# Patient Record
Sex: MALE | Race: WHITE | NOT HISPANIC OR LATINO
[De-identification: names, ages, dates, MRNs, and addresses within clinical notes are randomized per-mention and may not be internally consistent; named-entity substitution may affect disease eponyms.]

---

## 2017-02-01 ENCOUNTER — APPOINTMENT (OUTPATIENT)
Dept: INTERNAL MEDICINE | Facility: CLINIC | Age: 37
End: 2017-02-01

## 2017-02-05 ENCOUNTER — RX RENEWAL (OUTPATIENT)
Age: 37
End: 2017-02-05

## 2017-08-25 ENCOUNTER — LABORATORY RESULT (OUTPATIENT)
Age: 37
End: 2017-08-25

## 2017-08-25 ENCOUNTER — APPOINTMENT (OUTPATIENT)
Dept: INTERNAL MEDICINE | Facility: CLINIC | Age: 37
End: 2017-08-25
Payer: COMMERCIAL

## 2017-08-25 VITALS
WEIGHT: 180 LBS | BODY MASS INDEX: 24.38 KG/M2 | TEMPERATURE: 98.1 F | HEIGHT: 72 IN | OXYGEN SATURATION: 98 % | SYSTOLIC BLOOD PRESSURE: 142 MMHG | HEART RATE: 74 BPM | DIASTOLIC BLOOD PRESSURE: 86 MMHG

## 2017-08-25 LAB
BILIRUB UR QL STRIP: NEGATIVE
CLARITY UR: CLEAR
COLLECTION METHOD: NORMAL
GLUCOSE UR-MCNC: NEGATIVE
HCG UR QL: 0.2 EU/DL
HGB UR QL STRIP.AUTO: NEGATIVE
KETONES UR-MCNC: NEGATIVE
LEUKOCYTE ESTERASE UR QL STRIP: NEGATIVE
NITRITE UR QL STRIP: NEGATIVE
PH UR STRIP: 6
PROT UR STRIP-MCNC: NEGATIVE
SP GR UR STRIP: 1.02

## 2017-08-25 PROCEDURE — 99395 PREV VISIT EST AGE 18-39: CPT | Mod: 25

## 2017-08-25 PROCEDURE — 81002 URINALYSIS NONAUTO W/O SCOPE: CPT

## 2017-08-25 PROCEDURE — 36415 COLL VENOUS BLD VENIPUNCTURE: CPT

## 2017-08-29 LAB
25(OH)D3 SERPL-MCNC: 56.8 NG/ML
BASOPHILS # BLD AUTO: 0.03 K/UL
BASOPHILS NFR BLD AUTO: 0.7 %
CHOLEST SERPL-MCNC: 185 MG/DL
CHOLEST/HDLC SERPL: 3.9 RATIO
EOSINOPHIL # BLD AUTO: 0.1 K/UL
EOSINOPHIL NFR BLD AUTO: 2.2 %
HBA1C MFR BLD HPLC: 5.5 %
HCT VFR BLD CALC: 44.6 %
HDLC SERPL-MCNC: 47 MG/DL
HGB BLD-MCNC: 14.2 G/DL
IMM GRANULOCYTES NFR BLD AUTO: 0.2 %
LDLC SERPL CALC-MCNC: 123 MG/DL
LYMPHOCYTES # BLD AUTO: 1.74 K/UL
LYMPHOCYTES NFR BLD AUTO: 38.6 %
MAN DIFF?: NORMAL
MCHC RBC-ENTMCNC: 28.8 PG
MCHC RBC-ENTMCNC: 31.8 GM/DL
MCV RBC AUTO: 90.5 FL
MONOCYTES # BLD AUTO: 0.47 K/UL
MONOCYTES NFR BLD AUTO: 10.4 %
NEUTROPHILS # BLD AUTO: 2.16 K/UL
NEUTROPHILS NFR BLD AUTO: 47.9 %
PLATELET # BLD AUTO: 334 K/UL
RBC # BLD: 4.93 M/UL
RBC # FLD: 14.2 %
T4 SERPL-MCNC: 10.1 UG/DL
TESTOST BND SERPL-MCNC: 6.3 PG/ML
TESTOST SERPL-MCNC: 734.2 NG/DL
TRIGL SERPL-MCNC: 76 MG/DL
TSH SERPL-ACNC: 2.25 UIU/ML
WBC # FLD AUTO: 4.51 K/UL

## 2018-03-08 ENCOUNTER — APPOINTMENT (OUTPATIENT)
Dept: INTERNAL MEDICINE | Facility: CLINIC | Age: 38
End: 2018-03-08
Payer: COMMERCIAL

## 2018-03-08 VITALS
BODY MASS INDEX: 25.06 KG/M2 | TEMPERATURE: 98.8 F | DIASTOLIC BLOOD PRESSURE: 91 MMHG | HEIGHT: 72 IN | OXYGEN SATURATION: 98 % | WEIGHT: 185 LBS | SYSTOLIC BLOOD PRESSURE: 148 MMHG | HEART RATE: 71 BPM

## 2018-03-08 PROCEDURE — 99213 OFFICE O/P EST LOW 20 MIN: CPT

## 2018-08-31 ENCOUNTER — RX RENEWAL (OUTPATIENT)
Age: 38
End: 2018-08-31

## 2019-02-20 ENCOUNTER — MEDICATION RENEWAL (OUTPATIENT)
Age: 39
End: 2019-02-20

## 2019-12-15 ENCOUNTER — RX RENEWAL (OUTPATIENT)
Age: 39
End: 2019-12-15

## 2022-09-30 ENCOUNTER — APPOINTMENT (OUTPATIENT)
Dept: INTERNAL MEDICINE | Facility: CLINIC | Age: 42
End: 2022-09-30

## 2023-07-14 ENCOUNTER — OFFICE (OUTPATIENT)
Dept: URBAN - METROPOLITAN AREA CLINIC 70 | Facility: CLINIC | Age: 43
Setting detail: OPHTHALMOLOGY
End: 2023-07-14
Payer: COMMERCIAL

## 2023-07-14 DIAGNOSIS — H16.223: ICD-10-CM

## 2023-07-14 PROCEDURE — 92012 INTRM OPH EXAM EST PATIENT: CPT | Performed by: STUDENT IN AN ORGANIZED HEALTH CARE EDUCATION/TRAINING PROGRAM

## 2023-07-14 ASSESSMENT — REFRACTION_CURRENTRX
OD_SPHERE: -3.75
OD_OVR_VA: 20/
OS_OVR_VA: 20/
OS_CYLINDER: SPH
OD_CYLINDER: SPH
OS_SPHERE: -3.50

## 2023-07-14 ASSESSMENT — VISUAL ACUITY
OD_BCVA: 20/20
OS_BCVA: 20/20

## 2023-07-14 ASSESSMENT — REFRACTION_AUTOREFRACTION
OD_SPHERE: -3.75
OS_CYLINDER: -0.50
OS_SPHERE: -3.50
OD_AXIS: 19
OD_CYLINDER: -0.25
OS_AXIS: 149

## 2023-07-14 ASSESSMENT — AXIALLENGTH_DERIVED
OS_AL: 24.3414
OD_AL: 24.3933

## 2023-07-14 ASSESSMENT — KERATOMETRY
OS_K2POWER_DIOPTERS: 46.00
OS_K1POWER_DIOPTERS: 45.00
OD_K1POWER_DIOPTERS: 45.25
OD_K2POWER_DIOPTERS: 45.75
OD_AXISANGLE_DEGREES: 97
OS_AXISANGLE_DEGREES: 48

## 2023-07-14 ASSESSMENT — SPHEQUIV_DERIVED
OD_SPHEQUIV: -3.875
OS_SPHEQUIV: -3.75

## 2023-07-14 ASSESSMENT — TONOMETRY
OS_IOP_MMHG: 19
OD_IOP_MMHG: 17

## 2023-07-14 ASSESSMENT — REFRACTION_MANIFEST
OS_ADD: +1.00
OD_VA1: 20/20
OS_VA1: 20/20
OS_SPHERE: -3.75
OD_VA2: 20/20(J1+)
OD_SPHERE: -3.75
OD_ADD: +1.00
OS_VA2: 20/20(J1+)

## 2023-07-14 ASSESSMENT — SUPERFICIAL PUNCTATE KERATITIS (SPK)
OD_SPK: 1+
OS_SPK: 1+

## 2023-07-14 ASSESSMENT — CONFRONTATIONAL VISUAL FIELD TEST (CVF)
OD_FINDINGS: FULL
OS_FINDINGS: FULL

## 2023-07-28 ENCOUNTER — OFFICE (OUTPATIENT)
Dept: URBAN - METROPOLITAN AREA CLINIC 70 | Facility: CLINIC | Age: 43
Setting detail: OPHTHALMOLOGY
End: 2023-07-28
Payer: COMMERCIAL

## 2023-07-28 DIAGNOSIS — H18.513: ICD-10-CM

## 2023-07-28 DIAGNOSIS — H40.013: ICD-10-CM

## 2023-07-28 DIAGNOSIS — H52.7: ICD-10-CM

## 2023-07-28 DIAGNOSIS — H16.223: ICD-10-CM

## 2023-07-28 PROCEDURE — 92015 DETERMINE REFRACTIVE STATE: CPT | Performed by: STUDENT IN AN ORGANIZED HEALTH CARE EDUCATION/TRAINING PROGRAM

## 2023-07-28 PROCEDURE — 92133 CPTRZD OPH DX IMG PST SGM ON: CPT | Performed by: STUDENT IN AN ORGANIZED HEALTH CARE EDUCATION/TRAINING PROGRAM

## 2023-07-28 PROCEDURE — 92014 COMPRE OPH EXAM EST PT 1/>: CPT | Performed by: STUDENT IN AN ORGANIZED HEALTH CARE EDUCATION/TRAINING PROGRAM

## 2023-07-28 ASSESSMENT — REFRACTION_MANIFEST
OS_VA2: 20/20(J1+)
OD_CYLINDER: SPH
OS_ADD: +1.00
OS_VA1: 20/20
OD_SPHERE: -3.75
OD_ADD: +1.50
OS_SPHERE: -3.50
OS_SPHERE: -3.75
OD_VA1: 20/20
OS_AXIS: 145
OD_SPHERE: -3.50
OU_VA: 20/15
OS_CYLINDER: -0.50
OD_VA2: 20/20(J1+)
OS_VA1: 20/20
OD_VA1: 20/20
OD_ADD: +1.00
OS_ADD: +1.50

## 2023-07-28 ASSESSMENT — REFRACTION_CURRENTRX
OD_SPHERE: -3.75
OS_CYLINDER: SPH
OD_OVR_VA: 20/
OD_CYLINDER: SPH
OS_SPHERE: -3.50
OS_OVR_VA: 20/

## 2023-07-28 ASSESSMENT — SUPERFICIAL PUNCTATE KERATITIS (SPK)
OS_SPK: 1+
OD_SPK: 1+

## 2023-07-28 ASSESSMENT — AXIALLENGTH_DERIVED
OD_AL: 24.4949
OS_AL: 24.4425
OS_AL: 24.3904

## 2023-07-28 ASSESSMENT — REFRACTION_AUTOREFRACTION
OD_AXIS: 0
OD_SPHERE: -4.00
OS_SPHERE: -3.50
OS_CYLINDER: -0.75
OD_CYLINDER: 0.00
OS_AXIS: 145

## 2023-07-28 ASSESSMENT — KERATOMETRY
OS_K2POWER_DIOPTERS: 45.75
OD_AXISANGLE_DEGREES: 113
OD_K2POWER_DIOPTERS: 45.50
OD_K1POWER_DIOPTERS: 45.25
OS_K1POWER_DIOPTERS: 45.00
OS_AXISANGLE_DEGREES: 054

## 2023-07-28 ASSESSMENT — SPHEQUIV_DERIVED
OS_SPHEQUIV: -3.75
OS_SPHEQUIV: -3.875
OD_SPHEQUIV: -4

## 2023-07-28 ASSESSMENT — CONFRONTATIONAL VISUAL FIELD TEST (CVF)
OD_FINDINGS: FULL
OS_FINDINGS: FULL

## 2023-07-28 ASSESSMENT — VISUAL ACUITY
OS_BCVA: 20/20
OD_BCVA: 20/20

## 2024-05-31 ENCOUNTER — APPOINTMENT (OUTPATIENT)
Dept: INTERNAL MEDICINE | Facility: CLINIC | Age: 44
End: 2024-05-31
Payer: COMMERCIAL

## 2024-05-31 ENCOUNTER — NON-APPOINTMENT (OUTPATIENT)
Age: 44
End: 2024-05-31

## 2024-05-31 VITALS — SYSTOLIC BLOOD PRESSURE: 138 MMHG | DIASTOLIC BLOOD PRESSURE: 82 MMHG

## 2024-05-31 VITALS
HEIGHT: 70.5 IN | OXYGEN SATURATION: 97 % | DIASTOLIC BLOOD PRESSURE: 86 MMHG | BODY MASS INDEX: 26.24 KG/M2 | HEART RATE: 69 BPM | WEIGHT: 185.31 LBS | SYSTOLIC BLOOD PRESSURE: 143 MMHG | TEMPERATURE: 97.8 F

## 2024-05-31 DIAGNOSIS — Z12.11 ENCOUNTER FOR SCREENING FOR MALIGNANT NEOPLASM OF COLON: ICD-10-CM

## 2024-05-31 DIAGNOSIS — I10 ESSENTIAL (PRIMARY) HYPERTENSION: ICD-10-CM

## 2024-05-31 DIAGNOSIS — E66.3 OVERWEIGHT: ICD-10-CM

## 2024-05-31 DIAGNOSIS — G44.84 PRIMARY EXERTIONAL HEADACHE: ICD-10-CM

## 2024-05-31 DIAGNOSIS — Z87.09 PERSONAL HISTORY OF OTHER DISEASES OF THE RESPIRATORY SYSTEM: ICD-10-CM

## 2024-05-31 DIAGNOSIS — Z86.69 PERSONAL HISTORY OF OTHER DISEASES OF THE NERVOUS SYSTEM AND SENSE ORGANS: ICD-10-CM

## 2024-05-31 DIAGNOSIS — M19.012 PRIMARY OSTEOARTHRITIS, RIGHT SHOULDER: ICD-10-CM

## 2024-05-31 DIAGNOSIS — M19.011 PRIMARY OSTEOARTHRITIS, RIGHT SHOULDER: ICD-10-CM

## 2024-05-31 DIAGNOSIS — Z00.00 ENCOUNTER FOR GENERAL ADULT MEDICAL EXAMINATION W/OUT ABNORMAL FINDINGS: ICD-10-CM

## 2024-05-31 DIAGNOSIS — Z80.9 FAMILY HISTORY OF MALIGNANT NEOPLASM, UNSPECIFIED: ICD-10-CM

## 2024-05-31 DIAGNOSIS — R41.840 ATTENTION AND CONCENTRATION DEFICIT: ICD-10-CM

## 2024-05-31 DIAGNOSIS — R79.89 OTHER SPECIFIED ABNORMAL FINDINGS OF BLOOD CHEMISTRY: ICD-10-CM

## 2024-05-31 PROCEDURE — 36415 COLL VENOUS BLD VENIPUNCTURE: CPT

## 2024-05-31 PROCEDURE — 99386 PREV VISIT NEW AGE 40-64: CPT | Mod: 25

## 2024-05-31 PROCEDURE — 93000 ELECTROCARDIOGRAM COMPLETE: CPT

## 2024-05-31 RX ORDER — AMOXICILLIN AND CLAVULANATE POTASSIUM 875; 125 MG/1; MG/1
875-125 TABLET, COATED ORAL
Qty: 20 | Refills: 1 | Status: DISCONTINUED | COMMUNITY
Start: 2018-03-08 | End: 2024-05-31

## 2024-05-31 RX ORDER — MULTIVITAMIN
TABLET ORAL
Refills: 0 | Status: ACTIVE | COMMUNITY

## 2024-05-31 RX ORDER — METHYLPREDNISOLONE 4 MG/1
4 TABLET ORAL
Qty: 21 | Refills: 0 | Status: DISCONTINUED | COMMUNITY
Start: 2018-03-08 | End: 2024-05-31

## 2024-05-31 RX ORDER — SULFAMETHOXAZOLE AND TRIMETHOPRIM 800; 160 MG/1; MG/1
800-160 TABLET ORAL TWICE DAILY
Qty: 20 | Refills: 1 | Status: DISCONTINUED | COMMUNITY
Start: 2018-03-08 | End: 2024-05-31

## 2024-05-31 NOTE — HISTORY OF PRESENT ILLNESS
[de-identified] : 43 year old M with PMH HTN presents for establish care and initial CPE. Complains of multiple orthopedic issues. Has hx of concussions. Pt denies CP/SOB, fever/chills, n/v/d/c.

## 2024-05-31 NOTE — PHYSICAL EXAM
[No Acute Distress] : no acute distress [Well-Appearing] : well-appearing [Normal Sclera/Conjunctiva] : normal sclera/conjunctiva [PERRL] : pupils equal round and reactive to light [EOMI] : extraocular movements intact [Normal Outer Ear/Nose] : the outer ears and nose were normal in appearance [Normal Oropharynx] : the oropharynx was normal [No JVD] : no jugular venous distention [No Lymphadenopathy] : no lymphadenopathy [Supple] : supple [Thyroid Normal, No Nodules] : the thyroid was normal and there were no nodules present [No Respiratory Distress] : no respiratory distress  [No Accessory Muscle Use] : no accessory muscle use [Clear to Auscultation] : lungs were clear to auscultation bilaterally [Normal Rate] : normal rate  [Regular Rhythm] : with a regular rhythm [Normal S1, S2] : normal S1 and S2 [No Murmur] : no murmur heard [No Carotid Bruits] : no carotid bruits [No Abdominal Bruit] : a ~M bruit was not heard ~T in the abdomen [No Varicosities] : no varicosities [Pedal Pulses Present] : the pedal pulses are present [No Edema] : there was no peripheral edema [No Palpable Aorta] : no palpable aorta [No Extremity Clubbing/Cyanosis] : no extremity clubbing/cyanosis [Soft] : abdomen soft [Non Tender] : non-tender [Non-distended] : non-distended [No Masses] : no abdominal mass palpated [No HSM] : no HSM [Normal Bowel Sounds] : normal bowel sounds [Normal Posterior Cervical Nodes] : no posterior cervical lymphadenopathy [Normal Anterior Cervical Nodes] : no anterior cervical lymphadenopathy [No CVA Tenderness] : no CVA  tenderness [No Spinal Tenderness] : no spinal tenderness [No Joint Swelling] : no joint swelling [Grossly Normal Strength/Tone] : grossly normal strength/tone [Coordination Grossly Intact] : coordination grossly intact [No Focal Deficits] : no focal deficits [Normal Gait] : normal gait [Deep Tendon Reflexes (DTR)] : deep tendon reflexes were 2+ and symmetric [Normal Affect] : the affect was normal [Normal Insight/Judgement] : insight and judgment were intact [de-identified] : 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

## 2024-05-31 NOTE — PLAN
[FreeTextEntry1] : Routine blood work drawn in office and urine collected today. Refer to GI. Pt advised to sign up for [facility] portal to review labs and communicate any questions or concerns directly. Yearly physical and return as needed for illness, medication refills, and new or existing complaints.

## 2024-05-31 NOTE — HEALTH RISK ASSESSMENT
[Good] : ~his/her~  mood as  good [Yes] : Yes [2 - 3 times a week (3 pts)] : 2 - 3  times a week (3 points) [1 or 2 (0 pts)] : 1 or 2 (0 points) [Never (0 pts)] : Never (0 points) [0] : 2) Feeling down, depressed, or hopeless: Not at all (0) [PHQ-2 Negative - No further assessment needed] : PHQ-2 Negative - No further assessment needed [None] : None [With Family] : lives with family [Employed] : employed [] :  [# Of Children ___] : has [unfilled] children [Never] : Never [Audit-CScore] : 3 [PLI3Lpfel] : 0 [ColonoscopyComments] : Due

## 2024-05-31 NOTE — REVIEW OF SYSTEMS
[Joint Pain] : joint pain [Muscle Pain] : muscle pain [Back Pain] : back pain [Negative] : Heme/Lymph [Joint Stiffness] : no joint stiffness [Muscle Weakness] : no muscle weakness [Joint Swelling] : no joint swelling

## 2024-06-01 LAB
ALBUMIN SERPL ELPH-MCNC: 4.5 G/DL
ALP BLD-CCNC: 125 U/L
ALT SERPL-CCNC: 43 U/L
ANION GAP SERPL CALC-SCNC: 12 MMOL/L
APPEARANCE: CLEAR
AST SERPL-CCNC: 45 U/L
BACTERIA: NEGATIVE /HPF
BASOPHILS # BLD AUTO: 0.05 K/UL
BASOPHILS NFR BLD AUTO: 1 %
BILIRUB SERPL-MCNC: 0.2 MG/DL
BILIRUBIN URINE: NEGATIVE
BLOOD URINE: NEGATIVE
BUN SERPL-MCNC: 19 MG/DL
CALCIUM SERPL-MCNC: 9.5 MG/DL
CAST: 0 /LPF
CHLORIDE SERPL-SCNC: 103 MMOL/L
CHOLEST SERPL-MCNC: 174 MG/DL
CO2 SERPL-SCNC: 26 MMOL/L
COLOR: YELLOW
CREAT SERPL-MCNC: 1.12 MG/DL
EGFR: 83 ML/MIN/1.73M2
EOSINOPHIL # BLD AUTO: 0.07 K/UL
EOSINOPHIL NFR BLD AUTO: 1.4 %
EPITHELIAL CELLS: 0 /HPF
ESTIMATED AVERAGE GLUCOSE: 120 MG/DL
GLUCOSE QUALITATIVE U: NEGATIVE MG/DL
GLUCOSE SERPL-MCNC: 93 MG/DL
HBA1C MFR BLD HPLC: 5.8 %
HCT VFR BLD CALC: 47.5 %
HDLC SERPL-MCNC: 59 MG/DL
HGB BLD-MCNC: 14.9 G/DL
IMM GRANULOCYTES NFR BLD AUTO: 0 %
KETONES URINE: NEGATIVE MG/DL
LDLC SERPL CALC-MCNC: 103 MG/DL
LEUKOCYTE ESTERASE URINE: NEGATIVE
LYMPHOCYTES # BLD AUTO: 1.22 K/UL
LYMPHOCYTES NFR BLD AUTO: 24 %
MAN DIFF?: NORMAL
MCHC RBC-ENTMCNC: 28.3 PG
MCHC RBC-ENTMCNC: 31.4 GM/DL
MCV RBC AUTO: 90.1 FL
MICROSCOPIC-UA: NORMAL
MONOCYTES # BLD AUTO: 0.47 K/UL
MONOCYTES NFR BLD AUTO: 9.3 %
NEUTROPHILS # BLD AUTO: 3.27 K/UL
NEUTROPHILS NFR BLD AUTO: 64.3 %
NITRITE URINE: NEGATIVE
NONHDLC SERPL-MCNC: 115 MG/DL
PH URINE: 6
PLATELET # BLD AUTO: 283 K/UL
POTASSIUM SERPL-SCNC: 4.9 MMOL/L
PROT SERPL-MCNC: 7.2 G/DL
PROTEIN URINE: NEGATIVE MG/DL
PSA SERPL-MCNC: 1.61 NG/ML
RBC # BLD: 5.27 M/UL
RBC # FLD: 13.6 %
RED BLOOD CELLS URINE: 0 /HPF
SODIUM SERPL-SCNC: 142 MMOL/L
SPECIFIC GRAVITY URINE: 1.02
TRIGL SERPL-MCNC: 62 MG/DL
TSH SERPL-ACNC: 2.62 UIU/ML
UROBILINOGEN URINE: 0.2 MG/DL
WBC # FLD AUTO: 5.08 K/UL
WHITE BLOOD CELLS URINE: 0 /HPF

## 2024-08-06 ENCOUNTER — APPOINTMENT (OUTPATIENT)
Dept: INTERNAL MEDICINE | Facility: CLINIC | Age: 44
End: 2024-08-06

## 2024-08-22 ENCOUNTER — APPOINTMENT (OUTPATIENT)
Dept: ORTHOPEDIC SURGERY | Facility: CLINIC | Age: 44
End: 2024-08-22

## 2024-08-22 DIAGNOSIS — S46.319A STRAIN OF MUSCLE, FASCIA AND TENDON OF TRICEPS, UNSPECIFIED ARM, INITIAL ENCOUNTER: ICD-10-CM

## 2024-08-22 DIAGNOSIS — M19.012 PRIMARY OSTEOARTHRITIS, RIGHT SHOULDER: ICD-10-CM

## 2024-08-22 DIAGNOSIS — M24.022 LOOSE BODY IN LEFT ELBOW: ICD-10-CM

## 2024-08-22 DIAGNOSIS — M19.011 PRIMARY OSTEOARTHRITIS, RIGHT SHOULDER: ICD-10-CM

## 2024-08-22 DIAGNOSIS — M24.021 LOOSE BODY IN RIGHT ELBOW: ICD-10-CM

## 2024-08-22 PROCEDURE — 73010 X-RAY EXAM OF SHOULDER BLADE: CPT | Mod: 50

## 2024-08-22 PROCEDURE — 99203 OFFICE O/P NEW LOW 30 MIN: CPT | Mod: 25

## 2024-08-22 PROCEDURE — 70030 X-RAY EYE FOR FOREIGN BODY: CPT | Mod: 50

## 2024-08-22 PROCEDURE — 73030 X-RAY EXAM OF SHOULDER: CPT | Mod: 50

## 2024-08-22 NOTE — DISCUSSION/SUMMARY
[de-identified] : Assessment: The patient is a 44 year old male with physical exam findings consistent with B/L shoulder arthritis.   Patient and I discussed their symptoms. Discussed findings of today's exam and possible causes of patient's pain. Educated patient on their most probable diagnosis. Reviewed possible courses of treatment, and we collaboratively decided best course of treatment at this time will include:   1. Discussed PRP injections in B/L shoulders. Discussed procedure and patient would like to proceed with process 2. Discussed possible shoulder replacement in the future 3. Acute injury in the past that results in the patient chipping his elbow. MRI of B/L elbow to eval for loose body    Follow up for PRP x2  Instructions: Dx / Natural History The patient was advised of the diagnosis.  The natural history of the pathology was explained in full to the patient in layman's terms.  Several different treatment options were discussed and explained in full to the patient including the risks and benefits of both surgical and non-surgical treatments.  All questions and concerns were answered.   RICE I explained to the patient that rest, ice, compression, and elevation would benefit them.  They may return to activity after follow-up or when they no longer have any pain.   NSAIDs - OTC Patient is to begin over the counter oral anti-inflammatory medications on an as needed basis, as long as there are no medical contraindications.  Patient is counseled on possible GI and blood pressure side effects.   Pain Guide Activities The patient was advised to let pain guide the gradual advancement of activities.   Icing The patient was advised to apply ice (wrapped in a towel or protective covering) to the area daily (20 minutes at a time, 2-4X/day).   All of the patient's questions were answered to His satisfaction. Diagnoses and potential treatments were reviewed. He agreed with the plan and would like to move forward with it.

## 2024-08-22 NOTE — HISTORY OF PRESENT ILLNESS
[de-identified] : The patient is a 44-year-old M, RHD who presents today complaining of bilateral shoulder pain, L>R Date of Injury/Onset: Onset  Pain:    At Rest:  varies/10 With Activity:   7-8/10 Mechanism of injury: Gradual  Quality of symptoms: Constant aching and weakness  Improves with:  Gel injections  Worse with: General activity / ROM Prior treatment: Dr. Aguilar 07/2021 - glenohumeral joint osteoarthritis - gel injections Prior imaging: MRI  Previous injury: Dislocated shoulder back 25+ years School/Sport/Position/Occupation:_   Additional Information: [None]

## 2024-08-22 NOTE — IMAGING
[de-identified] : The patient is a well appearing 44 year old male of their stated age. Neck is supple & nontender to palpation. Negative Spurling's test.   BILATERAL Shoulder Inspection: Scapula Winging: Negative Deformity: None Erythema: None Ecchymosis: None Abrasions: None Effusion: Mild Crepitus: Moderate   Range of Motion: Active Forward Flexion:110 degrees Passive Forward Flexion:130 degrees Active IR : back pocket Active ER :30 degrees   Motor Exam: Forward Flexion: 4+ out of 5 Flexion Plane of Scapula: 5 out of 5 Abduction: 4+ out of 5 Internal Rotation: 5 out of 5 External Rotation: 4+ out of 5 Distal Motor Strength: 5 out of 5   Provocative Tests: Drop Arm: Negative [place]/Impingement: Positive [place]: Positive X-Arm Adduction: Negative Belly Press: Negative Bear Hug: Negative Lift Off: Negative Apprehension: Negative Relocation: Negative Posterior Load & Shift: Negative   Palpation: AC Joint: Nontender Clavicle: Nontender SC Joint: Nontender Bicepital Groove: Positive Coracoid Process: Positive Pectoralis Minor Tendon: Nontender Pectoralis Major Tendon: Nontender & palpably intact Latissimus Dorsi: Nontender Proximal Humerus: Positive Scapula Body: Nontender Medial Scapula Boarder: Nontender Scapula Spine: Nontender   X-ray examination of bilateral shoulder 3-4 views: no fractures or dislocations;  AC joint arthrosis; Type 2 acromion; subacromial spur; joint space narrowing; inferior humeral head osteophyte  Bilateral X-Ray Examination of the SCAPULA 1 or 2 views shows: no significant abnormalities

## 2024-08-28 ENCOUNTER — APPOINTMENT (OUTPATIENT)
Dept: MRI IMAGING | Facility: CLINIC | Age: 44
End: 2024-08-28

## 2024-08-28 PROCEDURE — 73221 MRI JOINT UPR EXTREM W/O DYE: CPT | Mod: LT

## 2024-08-28 PROCEDURE — 73221 MRI JOINT UPR EXTREM W/O DYE: CPT | Mod: RT

## 2024-09-12 ENCOUNTER — APPOINTMENT (OUTPATIENT)
Dept: ORTHOPEDIC SURGERY | Facility: CLINIC | Age: 44
End: 2024-09-12

## 2024-09-12 VITALS — HEIGHT: 70.5 IN | BODY MASS INDEX: 26.19 KG/M2 | WEIGHT: 185 LBS

## 2024-09-12 DIAGNOSIS — M19.011 PRIMARY OSTEOARTHRITIS, RIGHT SHOULDER: ICD-10-CM

## 2024-09-12 DIAGNOSIS — M77.8 OTHER ENTHESOPATHIES, NOT ELSEWHERE CLASSIFIED: ICD-10-CM

## 2024-09-12 DIAGNOSIS — M67.921 UNSPECIFIED DISORDER OF SYNOVIUM AND TENDON, RIGHT UPPER ARM: ICD-10-CM

## 2024-09-12 DIAGNOSIS — M19.012 PRIMARY OSTEOARTHRITIS, RIGHT SHOULDER: ICD-10-CM

## 2024-09-12 DIAGNOSIS — M67.922 UNSPECIFIED DISORDER OF SYNOVIUM AND TENDON, LEFT UPPER ARM: ICD-10-CM

## 2024-09-12 PROCEDURE — 005KIT: CUSTOM | Mod: 50

## 2024-09-12 PROCEDURE — 99213 OFFICE O/P EST LOW 20 MIN: CPT | Mod: NC

## 2024-09-12 NOTE — PROCEDURE
[Bilateral] : bilaterally of the [Shoulder] : shoulder [Pain] : pain [Inflammation] : inflammation [Alcohol] : alcohol [Ethyl Chloride sprayed topically] : ethyl chloride sprayed topically [Sterile technique used] : sterile technique used [Call if redness, pain or fever occur] : call if redness, pain or fever occur [Apply ice for 15min out of every hour for the next 12-24 hours as tolerated] : apply ice for 15 minutes out of every hour for the next 12-24 hours as tolerated [Patient was advised to rest the joint(s) for ____ days] : patient was advised to rest the joint(s) for [unfilled] days [Risks, benefits, alternatives discussed / Verbal consent obtained] : the risks benefits, and alternatives have been discussed, and verbal consent was obtained [de-identified] : PRP [FreeTextEntry3] : The risks, benefits, and alternatives to platelet rich plasma injection along with a discussion regarding its possible efficacy were reviewed with the patient. Risks outlined include but are not limited to bruising, bleeding, temporary increase in pain, infection, syncopal episode, failure to resolve symptoms and symptoms recurrence. Patient understood the risks and asked to proceed with this treatment course.

## 2024-09-12 NOTE — IMAGING
[de-identified] : The patient is a well appearing 44 year old male of their stated age. Neck is supple & nontender to palpation. Negative Spurling's test.   BILATERAL Shoulder Inspection: left tricep atropthy Scapula Winging: Negative Deformity: None Erythema: None Ecchymosis: None Abrasions: None Effusion: Mild Crepitus: Moderate   Range of Motion: Active Forward Flexion:110 degrees Passive Forward Flexion:130 degrees Active IR : back pocket Active ER :30 degrees   Motor Exam: Forward Flexion: 4+ out of 5 Flexion Plane of Scapula: 5 out of 5 Abduction: 4+ out of 5 Internal Rotation: 5 out of 5 External Rotation: 4+ out of 5 Distal Motor Strength: 5 out of 5   Provocative Tests: Drop Arm: Negative [place]/Impingement: Positive [place]: Positive X-Arm Adduction: Negative Belly Press: Negative Bear Hug: Negative Lift Off: Negative Apprehension: Negative Relocation: Negative Posterior Load & Shift: Negative   Palpation: AC Joint: Nontender Clavicle: Nontender SC Joint: Nontender Bicepital Groove: Positive Coracoid Process: Positive Pectoralis Minor Tendon: Nontender Pectoralis Major Tendon: Nontender & palpably intact Latissimus Dorsi: Nontender Proximal Humerus: Positive Scapula Body: Nontender Medial Scapula Boarder: Nontender Scapula Spine: Nontender

## 2024-09-12 NOTE — DATA REVIEWED
[MRI] : MRI [Bilateral] : of the bilateral [Elbow] : elbow [Report was reviewed and noted in the chart] : The report was reviewed and noted in the chart [I independently reviewed and interpreted images and report] : I independently reviewed and interpreted images and report [I reviewed the films/CD] : I reviewed the films/CD [FreeTextEntry1] : MRI [facility] 8/28/2024 - RIGHT ELBOW   1. Common extensor tendinopathy and fraying with moderate grade ill-defined tear at the humeral origin 2. Triceps insertional tendinopathy and fraying. Soft tissue edema with no bursitis. 3. Biceps insertional tendinopathy and fraying with low-grade interstitial tear at and proximal to the insertion. Traction spurring, traction cyst, and traction edema at the insertion with no fracture. 4. High signal and enlargement of the ulnar nerve at the humeral epicondyle with soft tissue edema with no ulnar nerve subluxation. These findings can be seen with ulnar neuropathy in the right clinical setting.   MRI [facility] 8/28/2024 - LEFT ELBOW   1. Common extensor tendinopathy and fraying with moderate grade tear at the humeral origin  2. Triceps insertional tendinopathy and fraying with traction edema olecranon and soft tissue edema with no bursitis  3. Diffuse joint space narrowing with thickened lateral and superior synovial fold and joint effusion which is nonspecific and can be seen with impingement in the right clinical setting  4. slight high signal ulnar nerve at the humeral epicondyle with soft tissue edema. This is nonspecific and can be seen with ulnar neuropathy in the right clinical setting

## 2024-09-12 NOTE — DISCUSSION/SUMMARY
[de-identified] : Assessment: The patient is a 44 year old male with physical exam findings consistent with B/L shoulder arthritis and b/l elbow tendonitis.   Patient and I discussed their symptoms. Discussed findings of today's exam and possible causes of patient's pain. Educated patient on their most probable diagnosis. Reviewed possible courses of treatment, and we collaboratively decided best course of treatment at this time will include:   1. Discussed possible shoulder replacement in the future, will proceed w/ nonoperative tx at this time.  2. Reviewed b/l elbow MRIs - discussed conservative tx for b/l elbow tendonitis w/ activity modification and ice 3 The patient was advised to let pain guide the gradual advancement of activities. 4. Pt was advised of post PRP protocol - For the first 48 hours after the procedure, no icing or NSAIDs until 2 weeks after the procedure - Tylenol OK for pain control, no strenuous activities for 2 weeks, apply heat to injected / treated area for 15 minutes every 2-3 hours while awake for 1 week, DO NOT soak the affected area(s) in a bathtub, pool, spa, etc for 48 hours after the procedure  Follow up 6 weeks  Instructions: Dx / Natural History The patient was advised of the diagnosis.  The natural history of the pathology was explained in full to the patient in layman's terms.  Several different treatment options were discussed and explained in full to the patient including the risks and benefits of both surgical and non-surgical treatments.  All questions and concerns were answered.   RICE I explained to the patient that rest, ice, compression, and elevation would benefit them.  They may return to activity after follow-up or when they no longer have any pain.   Pain Guide Activities The patient was advised to let pain guide the gradual advancement of activities.   Icing The patient was advised to apply ice (wrapped in a towel or protective covering) to the area daily (20 minutes at a time, 2-4X/day).   All of the patient's questions were answered to His satisfaction. Diagnoses and potential treatments were reviewed. He agreed with the plan and would like to move forward with it.

## 2024-10-24 ENCOUNTER — APPOINTMENT (OUTPATIENT)
Dept: ORTHOPEDIC SURGERY | Facility: CLINIC | Age: 44
End: 2024-10-24

## 2024-10-24 DIAGNOSIS — M19.011 PRIMARY OSTEOARTHRITIS, RIGHT SHOULDER: ICD-10-CM

## 2024-10-24 DIAGNOSIS — M19.012 PRIMARY OSTEOARTHRITIS, RIGHT SHOULDER: ICD-10-CM

## 2024-10-24 DIAGNOSIS — M67.921 UNSPECIFIED DISORDER OF SYNOVIUM AND TENDON, RIGHT UPPER ARM: ICD-10-CM

## 2024-10-24 PROCEDURE — 99215 OFFICE O/P EST HI 40 MIN: CPT

## 2025-01-16 ENCOUNTER — APPOINTMENT (OUTPATIENT)
Dept: ORTHOPEDIC SURGERY | Facility: CLINIC | Age: 45
End: 2025-01-16

## 2025-01-16 DIAGNOSIS — M19.011 PRIMARY OSTEOARTHRITIS, RIGHT SHOULDER: ICD-10-CM

## 2025-01-16 DIAGNOSIS — M25.551 PAIN IN RIGHT HIP: ICD-10-CM

## 2025-01-16 DIAGNOSIS — M16.12 UNILATERAL PRIMARY OSTEOARTHRITIS, LEFT HIP: ICD-10-CM

## 2025-01-16 DIAGNOSIS — M77.12 LATERAL EPICONDYLITIS, LEFT ELBOW: ICD-10-CM

## 2025-01-16 DIAGNOSIS — M77.11 LATERAL EPICONDYLITIS, RIGHT ELBOW: ICD-10-CM

## 2025-01-16 DIAGNOSIS — M25.552 PAIN IN RIGHT HIP: ICD-10-CM

## 2025-01-16 DIAGNOSIS — M16.11 UNILATERAL PRIMARY OSTEOARTHRITIS, RIGHT HIP: ICD-10-CM

## 2025-01-16 DIAGNOSIS — M19.012 PRIMARY OSTEOARTHRITIS, RIGHT SHOULDER: ICD-10-CM

## 2025-01-16 PROCEDURE — 73522 X-RAY EXAM HIPS BI 3-4 VIEWS: CPT

## 2025-01-16 PROCEDURE — 99215 OFFICE O/P EST HI 40 MIN: CPT | Mod: 25

## 2025-01-28 ENCOUNTER — APPOINTMENT (OUTPATIENT)
Facility: CLINIC | Age: 45
End: 2025-01-28
Payer: COMMERCIAL

## 2025-01-28 VITALS — WEIGHT: 185 LBS | HEIGHT: 70.5 IN | BODY MASS INDEX: 26.19 KG/M2

## 2025-01-28 DIAGNOSIS — M16.11 UNILATERAL PRIMARY OSTEOARTHRITIS, RIGHT HIP: ICD-10-CM

## 2025-01-28 DIAGNOSIS — M16.12 UNILATERAL PRIMARY OSTEOARTHRITIS, LEFT HIP: ICD-10-CM

## 2025-01-28 DIAGNOSIS — M25.851 OTHER SPECIFIED JOINT DISORDERS, RIGHT HIP: ICD-10-CM

## 2025-01-28 PROCEDURE — 99203 OFFICE O/P NEW LOW 30 MIN: CPT

## 2025-01-28 PROCEDURE — 99213 OFFICE O/P EST LOW 20 MIN: CPT

## 2025-04-10 ENCOUNTER — APPOINTMENT (OUTPATIENT)
Dept: ORTHOPEDIC SURGERY | Facility: CLINIC | Age: 45
End: 2025-04-10

## 2025-04-10 VITALS — BODY MASS INDEX: 26.19 KG/M2 | HEIGHT: 70.5 IN | WEIGHT: 185 LBS

## 2025-04-10 DIAGNOSIS — M77.12 LATERAL EPICONDYLITIS, LEFT ELBOW: ICD-10-CM

## 2025-04-10 DIAGNOSIS — M19.012 PRIMARY OSTEOARTHRITIS, RIGHT SHOULDER: ICD-10-CM

## 2025-04-10 DIAGNOSIS — M19.011 PRIMARY OSTEOARTHRITIS, RIGHT SHOULDER: ICD-10-CM

## 2025-04-10 DIAGNOSIS — M77.11 LATERAL EPICONDYLITIS, RIGHT ELBOW: ICD-10-CM

## 2025-04-10 PROCEDURE — 99213 OFFICE O/P EST LOW 20 MIN: CPT

## 2025-08-01 ENCOUNTER — APPOINTMENT (OUTPATIENT)
Dept: ORTHOPEDIC SURGERY | Facility: CLINIC | Age: 45
End: 2025-08-01
Payer: COMMERCIAL

## 2025-08-01 VITALS — BODY MASS INDEX: 25.06 KG/M2 | HEIGHT: 72 IN | WEIGHT: 185 LBS

## 2025-08-01 DIAGNOSIS — S63.641A SPRAIN OF METACARPOPHALANGEAL JOINT OF RIGHT THUMB, INITIAL ENCOUNTER: ICD-10-CM

## 2025-08-01 PROCEDURE — 99213 OFFICE O/P EST LOW 20 MIN: CPT

## 2025-08-01 PROCEDURE — 73130 X-RAY EXAM OF HAND: CPT | Mod: RT
